# Patient Record
Sex: FEMALE | Race: ASIAN | NOT HISPANIC OR LATINO | URBAN - METROPOLITAN AREA
[De-identification: names, ages, dates, MRNs, and addresses within clinical notes are randomized per-mention and may not be internally consistent; named-entity substitution may affect disease eponyms.]

---

## 2017-05-10 ENCOUNTER — OFFICE VISIT (OUTPATIENT)
Dept: INTERNAL MEDICINE | Facility: CLINIC | Age: 22
End: 2017-05-10
Payer: OTHER GOVERNMENT

## 2017-05-10 VITALS
HEIGHT: 67 IN | RESPIRATION RATE: 16 BRPM | SYSTOLIC BLOOD PRESSURE: 99 MMHG | HEART RATE: 72 BPM | BODY MASS INDEX: 24.63 KG/M2 | TEMPERATURE: 98 F | DIASTOLIC BLOOD PRESSURE: 62 MMHG | WEIGHT: 156.94 LBS | OXYGEN SATURATION: 97 %

## 2017-05-10 DIAGNOSIS — J45.20 ALLERGY-INDUCED ASTHMA, MILD INTERMITTENT, UNCOMPLICATED: Primary | ICD-10-CM

## 2017-05-10 DIAGNOSIS — Z23 NEED FOR HPV VACCINATION: ICD-10-CM

## 2017-05-10 PROCEDURE — 99203 OFFICE O/P NEW LOW 30 MIN: CPT | Mod: S$PBB,,, | Performed by: FAMILY MEDICINE

## 2017-05-10 PROCEDURE — 99203 OFFICE O/P NEW LOW 30 MIN: CPT | Mod: PBBFAC,PO | Performed by: FAMILY MEDICINE

## 2017-05-10 PROCEDURE — 90651 9VHPV VACCINE 2/3 DOSE IM: CPT | Mod: PBBFAC,PO | Performed by: FAMILY MEDICINE

## 2017-05-10 PROCEDURE — 99999 PR PBB SHADOW E&M-NEW PATIENT-LVL III: CPT | Mod: PBBFAC,,, | Performed by: FAMILY MEDICINE

## 2017-05-10 RX ORDER — ALBUTEROL SULFATE 90 UG/1
2 AEROSOL, METERED RESPIRATORY (INHALATION) EVERY 6 HOURS PRN
Qty: 18 G | Refills: 2 | Status: SHIPPED | OUTPATIENT
Start: 2017-05-10 | End: 2017-05-10 | Stop reason: SDUPTHER

## 2017-05-10 RX ORDER — ALBUTEROL SULFATE 90 UG/1
2 AEROSOL, METERED RESPIRATORY (INHALATION) EVERY 6 HOURS PRN
COMMUNITY
End: 2017-05-10 | Stop reason: SDUPTHER

## 2017-05-10 RX ORDER — FEXOFENADINE HCL 60 MG
60 TABLET ORAL DAILY
COMMUNITY

## 2017-05-10 RX ORDER — FLUTICASONE PROPIONATE 110 UG/1
2 AEROSOL, METERED RESPIRATORY (INHALATION) 2 TIMES DAILY
COMMUNITY

## 2017-05-10 RX ORDER — ALBUTEROL SULFATE 90 UG/1
2 AEROSOL, METERED RESPIRATORY (INHALATION) EVERY 6 HOURS PRN
Qty: 18 G | Refills: 2 | Status: SHIPPED | OUTPATIENT
Start: 2017-05-10

## 2017-05-10 NOTE — PROGRESS NOTES
Subjective:       Patient ID: Mirtha Alvarez is a 21 y.o. female.    Chief Complaint: Medication Refill (rescue inhaler)    HPI  Came in today with her mother to get refill on albuterol rescue inhaler.  She is in town for the end of her semester.  She goes to school at [employer] in [facility] and says that whenever she is in [place] she has not have any ALLERGY no asthma problems.  She is to have significant asthma problems when she was a child but over the last few years has only had issues that were triggered by ALLERGY exposures.  She usually takes Allegra all the time but only needs to start taking daily Flovent whenever she is back home in [place].  She never needs the Flovent nor albuterol she is in [place].  Currently she feels fine but wanted to get the rescue inhaler refill for just in case.    Reviewed vaccination history and it seems that she has never had the HPV vaccine.  Otherwise, is up-to-date.    Family History   Problem Relation Age of Onset    No Known Problems Mother     Hypertension Father     Kidney disease Father        Current Outpatient Prescriptions:     albuterol (VENTOLIN HFA) 90 mcg/actuation inhaler, Inhale 2 puffs into the lungs every 6 (six) hours as needed for Wheezing. Rescue, Disp: 18 g, Rfl: 2    fexofenadine (ALLEGRA) 60 MG tablet, Take 60 mg by mouth once daily., Disp: , Rfl:     fluticasone (FLOVENT HFA) 110 mcg/actuation inhaler, Inhale 2 puffs into the lungs 2 (two) times daily. Controller, Disp: , Rfl:     norgestrel-ethinyl estradiol (LO/OVRAL) 0.3-30 mg-mcg per tablet, Take 1 tablet by mouth once daily., Disp: , Rfl:     Review of Systems   Constitutional: Negative for chills and fever.   HENT: Negative for congestion and sore throat.    Eyes: Negative for visual disturbance.   Respiratory: Negative for cough and shortness of breath.    Cardiovascular: Negative for chest pain.   Gastrointestinal: Negative for abdominal pain, constipation and diarrhea.   Endocrine:  "Negative for polydipsia and polyuria.   Genitourinary: Negative for difficulty urinating and menstrual problem.   Skin: Negative for rash.   Neurological: Negative for dizziness.   Hematological: Does not bruise/bleed easily.       Objective:   BP 99/62 (BP Location: Left arm, Patient Position: Sitting, BP Method: Automatic)  Pulse 72  Temp 98.1 °F (36.7 °C) (Tympanic)   Resp 16  Ht 5' 7" (1.702 m)  Wt 71.2 kg (156 lb 15.5 oz)  LMP 05/08/2017  SpO2 97%  BMI 24.58 kg/m2     Physical Exam   Constitutional: She is oriented to person, place, and time. She appears well-developed and well-nourished. No distress.   HENT:   Head: Normocephalic and atraumatic.   Nose: Nose normal.   Eyes: Conjunctivae and EOM are normal. Pupils are equal, round, and reactive to light. Right eye exhibits no discharge. Left eye exhibits no discharge.   Neck: No thyromegaly present.   Cardiovascular: Normal rate and regular rhythm.    No murmur heard.  Pulmonary/Chest: Effort normal and breath sounds normal. No respiratory distress.   Abdominal: Soft. She exhibits no distension.   Musculoskeletal: She exhibits no edema.   Neurological: She is alert and oriented to person, place, and time.   Skin: No rash noted. She is not diaphoretic.   Psychiatric: She has a normal mood and affect. Her behavior is normal.   Vitals reviewed.      Assessment & Plan     1. Allergy-induced asthma, mild intermittent, uncomplicated  Provided with refill for albuterol.  Advised her to continue the same regimen that she has been doing since it has been well controlled.    2. Need for HPV vaccination  Given the first of 3 HPV vaccines today in the clinic.  I also recommended that she consider doing blood work for routine physical including STD screening and lipid screening at the next visit.  She deferred doing this at this time.      "

## 2017-05-10 NOTE — MR AVS SNAPSHOT
[facility]  [address] [address] [address]  Phone: [phone]                  Mirtha Alvarez   5/10/2017 4:20 PM   Office Visit    Description:  Female : 1995   Provider:  Vivek Curiel DO   Department:  [facility]           Reason for Visit     Medication Refill           Diagnoses this Visit        Comments    Allergy-induced asthma, mild intermittent, uncomplicated    -  Primary     Need for HPV vaccination                To Do List           Goals (5 Years of Data)     None       These Medications        Disp Refills Start End    albuterol (VENTOLIN HFA) 90 mcg/actuation inhaler 18 g 2 5/10/2017     Inhale 2 puffs into the lungs every 6 (six) hours as needed for Wheezing. Rescue - Inhalation    Pharmacy: [facility] - [address] #: [phone]         [facility]     [facility] On [facility] Nurse Care Line -  Assistance  Unless otherwise directed by your provider, please contact [facility], our nurse care line that is available for  assistance.     Registered nurses in the [facility] Center provide: appointment scheduling, clinical advisement, health education, and other advisory services.  Call: [phone] (toll free)               Medications           Message regarding Medications     Verify the changes and/or additions to your medication regime listed below are the same as discussed with your clinician today.  If any of these changes or additions are incorrect, please notify your healthcare provider.        START taking these NEW medications        Refills    albuterol (VENTOLIN HFA) 90 mcg/actuation inhaler 2    Sig: Inhale 2 puffs into the lungs every 6 (six) hours as needed for Wheezing. Rescue    Class: Normal    Route: Inhalation           Verify that the below list of medications is an accurate representation of the medications you are currently taking.  If none  "reported, the list may be blank. If incorrect, please contact your healthcare provider. Carry this list with you in case of emergency.           Current Medications     albuterol (VENTOLIN HFA) 90 mcg/actuation inhaler Inhale 2 puffs into the lungs every 6 (six) hours as needed for Wheezing. Rescue    fexofenadine (ALLEGRA) 60 MG tablet Take 60 mg by mouth once daily.    fluticasone (FLOVENT HFA) 110 mcg/actuation inhaler Inhale 2 puffs into the lungs 2 (two) times daily. Controller    norgestrel-ethinyl estradiol (LO/OVRAL) 0.3-30 mg-mcg per tablet Take 1 tablet by mouth once daily.           Clinical Reference Information           Your Vitals Were     BP Pulse Temp Resp Height    99/62 (BP Location: Left arm, Patient Position: Sitting, BP Method: Automatic) 72 98.1 °F (36.7 °C) (Tympanic) 16 5' 7" (1.702 m)    Weight Last Period SpO2 BMI    71.2 kg (156 lb 15.5 oz) 05/08/2017 97% 24.58 kg/m2      Blood Pressure          Most Recent Value    BP  99/62      Allergies as of 5/10/2017     Shrimp      Immunizations Administered on Date of Encounter - 5/10/2017     Name Date Dose VIS Date Route    HPV 9-Valent  Incomplete 0.5 mL 12/2/2016 Intramuscular      Orders Placed During Today's Visit      Normal Orders This Visit    HPV Vaccine (9-Valent) (3 Dose) (IM)       [facility] Sign-Up     Activating your [facility] account is as easy as 1-2-3!     1) Visit [email or web address], select Sign Up Now, enter this activation code and your date of birth, then select Next.  [email or web address]  Expires: 6/24/2017  3:29 PM      2) Create a username and password to use when you visit [facility] in the future and select a security question in case you lose your password and select Next.    3) Enter your e-mail address and click Sign Up!    Additional Information  If you have questions, please e-mail [email or web address] or call [phone] to talk to our [facility] staff. Remember, [facility] is NOT to be used for urgent needs. For medical " emergencies, dial 911.         Language Assistance Services     ATTENTION: Language assistance services are available, free of charge. Please call [phone].      ATENCIÓN: Si habla jacoby, tiene a hassan disposición servicios gratuitos de asistencia lingüística. Llame al [phone].     CHÚ Ý: N?u b?n nói Ti?ng Vi?t, có các d?ch v? h? tr? ngôn ng? mi?n phí dành cho b?n. G?i s? [phone].         [facility] complies with applicable Federal civil rights laws and does not discriminate on the basis of race, color, national origin, age, disability, or sex.

## 2017-06-05 ENCOUNTER — TELEPHONE (OUTPATIENT)
Dept: INTERNAL MEDICINE | Facility: CLINIC | Age: 22
End: 2017-06-05

## 2017-06-05 NOTE — TELEPHONE ENCOUNTER
----- Message from Alfreda Allen sent at 6/5/2017 12:43 PM CDT -----  Patients mother came by stating patient was seen on 5/10/17. [facility] has not received a prescription from Dr. Curiel.  Please call patient at [phone] or patients mother at [phone].

## 2017-08-17 ENCOUNTER — TELEPHONE (OUTPATIENT)
Dept: INTERNAL MEDICINE | Facility: CLINIC | Age: 22
End: 2017-08-17

## 2017-08-17 ENCOUNTER — CLINICAL SUPPORT (OUTPATIENT)
Dept: INTERNAL MEDICINE | Facility: CLINIC | Age: 22
End: 2017-08-17
Payer: OTHER GOVERNMENT

## 2017-08-17 DIAGNOSIS — Z23 NEED FOR HPV VACCINATION: Primary | ICD-10-CM

## 2017-08-17 PROCEDURE — 90651 9VHPV VACCINE 2/3 DOSE IM: CPT | Mod: PBBFAC,PO

## 2017-08-17 NOTE — TELEPHONE ENCOUNTER
----- Message from Karine Judge sent at 8/17/2017 11:24 AM CDT -----  Contact: Kushal/mom  Patient needs to come in for her second HPV shot, Please call her at [phone].    Thanks  td

## 2017-08-17 NOTE — PROGRESS NOTES
Pt here to clinic for #2 hpv. inj given to rt deltoid using aseptic technique. Pt tolerated well.

## 2017-08-17 NOTE — TELEPHONE ENCOUNTER
----- Message from Sherrill Judge sent at 8/17/2017 10:35 AM CDT -----  Contact: pt   Call caller regarding getting pt schedule for HPV injection.   ..[phone]

## 2017-11-24 ENCOUNTER — CLINICAL SUPPORT (OUTPATIENT)
Dept: INTERNAL MEDICINE | Facility: CLINIC | Age: 22
End: 2017-11-24
Payer: OTHER GOVERNMENT

## 2017-11-24 DIAGNOSIS — Z23 NEED FOR HPV VACCINATION: Primary | ICD-10-CM

## 2017-11-24 PROCEDURE — 90651 9VHPV VACCINE 2/3 DOSE IM: CPT | Mod: PBBFAC,PO

## 2017-11-24 PROCEDURE — 99211 OFF/OP EST MAY X REQ PHY/QHP: CPT | Mod: PBBFAC,PO

## 2017-11-24 PROCEDURE — 99999 PR PBB SHADOW E&M-EST. PATIENT-LVL I: CPT | Mod: PBBFAC,,,

## 2017-11-24 RX ORDER — MINERAL OIL
180 ENEMA (ML) RECTAL
COMMUNITY

## 2017-11-24 RX ORDER — ECONAZOLE NITRATE 10 MG/G
CREAM TOPICAL
COMMUNITY
Start: 2017-10-24